# Patient Record
Sex: FEMALE | Race: WHITE
[De-identification: names, ages, dates, MRNs, and addresses within clinical notes are randomized per-mention and may not be internally consistent; named-entity substitution may affect disease eponyms.]

---

## 2019-06-07 ENCOUNTER — HOSPITAL ENCOUNTER (EMERGENCY)
Dept: HOSPITAL 91 - FTE | Age: 69
LOS: 1 days | Discharge: HOME | End: 2019-06-08
Payer: SELF-PAY

## 2019-06-07 ENCOUNTER — HOSPITAL ENCOUNTER (EMERGENCY)
Dept: HOSPITAL 10 - FTE | Age: 69
LOS: 1 days | Discharge: HOME | End: 2019-06-08
Payer: SELF-PAY

## 2019-06-07 VITALS
BODY MASS INDEX: 33.76 KG/M2 | HEIGHT: 60 IN | HEIGHT: 60 IN | BODY MASS INDEX: 33.76 KG/M2 | WEIGHT: 171.96 LBS | WEIGHT: 171.96 LBS

## 2019-06-07 DIAGNOSIS — M25.561: Primary | ICD-10-CM

## 2019-06-07 LAB
ADD MAN DIFF?: NO
ALANINE AMINOTRANSFERASE: 21 IU/L (ref 13–69)
ALBUMIN/GLOBULIN RATIO: 1.27
ALBUMIN: 4.7 G/DL (ref 3.3–4.9)
ALKALINE PHOSPHATASE: 75 IU/L (ref 42–121)
ANION GAP: 11 (ref 5–13)
ASPARTATE AMINO TRANSFERASE: 23 IU/L (ref 15–46)
BASOPHIL #: 0 10^3/UL (ref 0–0.1)
BASOPHILS %: 0.5 % (ref 0–2)
BILIRUBIN,DIRECT: 0 MG/DL (ref 0–0.2)
BILIRUBIN,TOTAL: 0.5 MG/DL (ref 0.2–1.3)
BLOOD UREA NITROGEN: 13 MG/DL (ref 7–20)
CALCIUM: 10 MG/DL (ref 8.4–10.2)
CARBON DIOXIDE: 29 MMOL/L (ref 21–31)
CHLORIDE: 102 MMOL/L (ref 97–110)
CREATININE: 0.61 MG/DL (ref 0.44–1)
EOSINOPHILS #: 0.1 10^3/UL (ref 0–0.5)
EOSINOPHILS %: 2.3 % (ref 0–7)
GLOBULIN: 3.7 G/DL (ref 1.3–3.2)
GLUCOSE: 104 MG/DL (ref 70–220)
HEMATOCRIT: 42 % (ref 37–47)
HEMOGLOBIN: 13.5 G/DL (ref 12–16)
IMMATURE GRANS #M: 0.01 10^3/UL (ref 0–0.03)
IMMATURE GRANS % (M): 0.2 % (ref 0–0.43)
LYMPHOCYTES #: 1.7 10^3/UL (ref 0.8–2.9)
LYMPHOCYTES %: 30.4 % (ref 15–51)
MEAN CORPUSCULAR HEMOGLOBIN: 29.5 PG (ref 29–33)
MEAN CORPUSCULAR HGB CONC: 32.1 G/DL (ref 32–37)
MEAN CORPUSCULAR VOLUME: 91.7 FL (ref 82–101)
MEAN PLATELET VOLUME: 11.9 FL (ref 7.4–10.4)
MONOCYTE #: 0.6 10^3/UL (ref 0.3–0.9)
MONOCYTES %: 10 % (ref 0–11)
NEUTROPHIL #: 3.2 10^3/UL (ref 1.6–7.5)
NEUTROPHILS %: 56.6 % (ref 39–77)
NUCLEATED RED BLOOD CELLS #: 0 10^3/UL (ref 0–0)
NUCLEATED RED BLOOD CELLS%: 0 /100WBC (ref 0–0)
PLATELET COUNT: 161 10^3/UL (ref 140–415)
POTASSIUM: 4.1 MMOL/L (ref 3.5–5.1)
RED BLOOD COUNT: 4.58 10^6/UL (ref 4.2–5.4)
RED CELL DISTRIBUTION WIDTH: 12.8 % (ref 11.5–14.5)
SODIUM: 142 MMOL/L (ref 135–144)
TOTAL PROTEIN: 8.4 G/DL (ref 6.1–8.1)
WHITE BLOOD COUNT: 5.6 10^3/UL (ref 4.8–10.8)

## 2019-06-07 PROCEDURE — 73700 CT LOWER EXTREMITY W/O DYE: CPT

## 2019-06-07 PROCEDURE — 80053 COMPREHEN METABOLIC PANEL: CPT

## 2019-06-07 PROCEDURE — 85025 COMPLETE CBC W/AUTO DIFF WBC: CPT

## 2019-06-07 PROCEDURE — 99283 EMERGENCY DEPT VISIT LOW MDM: CPT

## 2019-06-08 NOTE — ERD
ER Documentation


Chief Complaint


Chief Complaint





R KNEE PAIN AND SWELLING X'S 2 WEEKS





ROS


All systems reviewed and are negative except as per history of present illness.





Medications


Home Meds


Active Scripts


Acetaminophen* (Acetaminophen*) 500 MG Extra Strength Tablet, 500 MG PO Q4H PRN 


for PAIN AND OR ELEVATED TEMP, #30 TAB


   Prov:KENNETH METCALF DO         6/8/19





Allergies


Allergies:  


Coded Allergies:  


     No Known Allergy (Unverified , 6/7/19)





PMhx/Soc


Medical and Surgical Hx:  pt denies Medical Hx, pt denies Surgical Hx


Hx Alcohol Use:  No


Hx Substance Use:  No


Hx Tobacco Use:  No


Smoking Status:  Never smoker





Physical Exam


Vitals





Vital Signs


  Date      Temp  Pulse  Resp  B/P (MAP)   Pulse Ox  O2          O2 Flow    FiO2


Time                                                 Delivery    Rate


    6/7/19  97.1     71    18      156/74        99


     20:29                          (101)





Physical Exam


Const:   No acute distress


Head:   Atraumatic 


Eyes:    Normal Conjunctiva


ENT:    Normal External Ears, Nose and Mouth.


Neck:               Full range of motion. No meningismus.


Resp:   Clear to auscultation bilaterally


Cardio:   Regular rate and rhythm, no murmurs


Abd:    Soft, non tender, non distended. Normal bowel sounds


Skin:   No petechiae or rashes


Back:   No midline or flank tenderness


Ext:    No cyanosis, or edema


Neur:   Awake and alert


Psych:    Normal Mood and Affect


Result Diagram:  


6/7/19 2127 6/7/19 2127





Results 24 hrs





Laboratory Tests


              Test
                                  6/7/19
21:27


              White Blood Count                      5.6 10^3/ul


              Red Blood Count                       4.58 10^6/ul


              Hemoglobin                               13.5 g/dl


              Hematocrit                                  42.0 %


              Mean Corpuscular Volume                    91.7 fl


              Mean Corpuscular Hemoglobin                29.5 pg


              Mean Corpuscular Hemoglobin
Concent     32.1 g/dl 



              Red Cell Distribution Width                 12.8 %


              Platelet Count                         161 10^3/UL


              Mean Platelet Volume                       11.9 fl


              Immature Granulocytes %                    0.200 %


              Neutrophils %                               56.6 %


              Lymphocytes %                               30.4 %


              Monocytes %                                 10.0 %


              Eosinophils %                                2.3 %


              Basophils %                                  0.5 %


              Nucleated Red Blood Cells %            0.0 /100WBC


              Immature Granulocytes #              0.010 10^3/ul


              Neutrophils #                          3.2 10^3/ul


              Lymphocytes #                          1.7 10^3/ul


              Monocytes #                            0.6 10^3/ul


              Eosinophils #                          0.1 10^3/ul


              Basophils #                            0.0 10^3/ul


              Nucleated Red Blood Cells #            0.0 10^3/ul


              Sodium Level                            142 mmol/L


              Potassium Level                         4.1 mmol/L


              Chloride Level                          102 mmol/L


              Carbon Dioxide Level                     29 mmol/L


              Anion Gap                                       11


              Blood Urea Nitrogen                       13 mg/dl


              Creatinine                              0.61 mg/dl


              Est Glomerular Filtrat Rate
mL/min   > 60 mL/min 



              Glucose Level                            104 mg/dl


              Calcium Level                           10.0 mg/dl


              Total Bilirubin                          0.5 mg/dl


              Direct Bilirubin                        0.00 mg/dl


              Indirect Bilirubin                       0.5 mg/dl


              Aspartate Amino Transf
(AST/SGOT)         23 IU/L 



              Alanine Aminotransferase
(ALT/SGPT)       21 IU/L 



              Alkaline Phosphatase                       75 IU/L


              Total Protein                             8.4 g/dl


              Albumin                                   4.7 g/dl


              Globulin                                 3.70 g/dl


              Albumin/Globulin Ratio                        1.27





Current Medications


 Medications
   Dose
          Sig/Jose Carlos
       Start Time
   Status  Last


 (Trade)       Ordered        Route
 PRN     Stop Time              Admin
Dose


                              Reason                                Admin


                1.25 mg        ONCE  STAT
    6/7/19        DC       



Levalbuterol
                 INH
           21:56
 6/7/19


 (Xopenex                                    22:15


Neb)








Departure


Diagnosis:  


   Primary Impression:  


   Knee pain


   Chronicity:  unspecified  Laterality:  right  Qualified Codes:  M25.561 - 


   Pain in right knee


Condition:  Fair


Patient Instructions:  Knee Pain, Uncertain Cause


Referrals:  


[facility]


YOU HAVE RECEIVED A MEDICAL SCREENING EXAM AND THE RESULTS INDICATE THAT YOU DO 


NOT HAVE A CONDITION THAT REQUIRES URGENT TREATMENT IN THE EMERGENCY DEPARTMENT.





FURTHER EVALUATION AND TREATMENT OF YOUR CONDITION CAN WAIT UNTIL YOU ARE SEEN 


IN YOUR DOCTORS OFFICE WITHIN THE NEXT 1-2 DAYS. IT IS YOUR RESPONSIBILITY TO 


MAKE AN APPOINTMENT FOR FOLOW-UP CARE.





IF YOU HAVE A PRIMARY DOCTOR


--you should call your primary doctor and schedule an appointment





IF YOU DO NOT HAVE A PRIMARY DOCTOR YOU CAN CALL OUR PHYSICIAN REFERRAL HOTLINE 


AT


 [phone] 





IF YOU CAN NOT AFFORD TO SEE A PHYSICIAN YOU CAN CHOSE FROM THE FOLLOWING 


[facility]





[facility] [phone] [address] [facility] [phone] [address] [facility]. [facility] [phone] [address] [facility] [phone] [address] [facility]. [facility] [phone] [address] [facility]. [facility] [phone] [address]





Additional Instructions:  


Call your primary care doctor TOMORROW for an appointment during the next 1-2 


days.See the doctor sooner or return here if your condition worsens before your 


appointment time.











KENNETH METCALF DO                  Jun 8, 2019 00:55